# Patient Record
Sex: MALE | Race: WHITE | Employment: OTHER | URBAN - METROPOLITAN AREA
[De-identification: names, ages, dates, MRNs, and addresses within clinical notes are randomized per-mention and may not be internally consistent; named-entity substitution may affect disease eponyms.]

---

## 2017-04-25 ENCOUNTER — HOSPITAL ENCOUNTER (EMERGENCY)
Age: 62
Discharge: HOME | End: 2017-04-25
Attending: EMERGENCY MEDICINE
Payer: MEDICARE

## 2017-04-25 ENCOUNTER — APPOINTMENT (OUTPATIENT)
Dept: CT IMAGING | Age: 62
End: 2017-04-25
Attending: EMERGENCY MEDICINE
Payer: MEDICARE

## 2017-04-25 VITALS
TEMPERATURE: 98 F | HEIGHT: 73 IN | WEIGHT: 290 LBS | DIASTOLIC BLOOD PRESSURE: 78 MMHG | BODY MASS INDEX: 38.43 KG/M2 | HEART RATE: 71 BPM | SYSTOLIC BLOOD PRESSURE: 170 MMHG | OXYGEN SATURATION: 95 % | RESPIRATION RATE: 18 BRPM

## 2017-04-25 DIAGNOSIS — N20.1 URETEROLITHIASIS: Primary | ICD-10-CM

## 2017-04-25 LAB
ALBUMIN SERPL BCP-MCNC: 3.7 G/DL (ref 3.2–4.6)
ALBUMIN/GLOB SERPL: 1 {RATIO} (ref 1.2–3.5)
ALP SERPL-CCNC: 82 U/L (ref 50–136)
ALT SERPL-CCNC: 33 U/L (ref 12–65)
ANION GAP BLD CALC-SCNC: 8 MMOL/L (ref 7–16)
AST SERPL W P-5'-P-CCNC: 27 U/L (ref 15–37)
BACTERIA URNS QL MICRO: 0 /HPF
BASOPHILS # BLD AUTO: 0 K/UL (ref 0–0.2)
BASOPHILS # BLD: 0 % (ref 0–2)
BILIRUB SERPL-MCNC: 1.2 MG/DL (ref 0.2–1.1)
BUN SERPL-MCNC: 16 MG/DL (ref 8–23)
CALCIUM SERPL-MCNC: 9.2 MG/DL (ref 8.3–10.4)
CASTS URNS QL MICRO: 0 /LPF
CHLORIDE SERPL-SCNC: 105 MMOL/L (ref 98–107)
CO2 SERPL-SCNC: 27 MMOL/L (ref 21–32)
CREAT SERPL-MCNC: 1.19 MG/DL (ref 0.8–1.5)
DIFFERENTIAL METHOD BLD: ABNORMAL
EOSINOPHIL # BLD: 0.1 K/UL (ref 0–0.8)
EOSINOPHIL NFR BLD: 1 % (ref 0.5–7.8)
EPI CELLS #/AREA URNS HPF: 0 /HPF
ERYTHROCYTE [DISTWIDTH] IN BLOOD BY AUTOMATED COUNT: 12.9 % (ref 11.9–14.6)
GLOBULIN SER CALC-MCNC: 3.6 G/DL (ref 2.3–3.5)
GLUCOSE SERPL-MCNC: 92 MG/DL (ref 65–100)
HCT VFR BLD AUTO: 46.7 % (ref 41.1–50.3)
HGB BLD-MCNC: 16.4 G/DL (ref 13.6–17.2)
IMM GRANULOCYTES # BLD: 0 K/UL (ref 0–0.5)
IMM GRANULOCYTES NFR BLD AUTO: 0.1 % (ref 0–5)
LIPASE SERPL-CCNC: 97 U/L (ref 73–393)
LYMPHOCYTES # BLD AUTO: 17 % (ref 13–44)
LYMPHOCYTES # BLD: 1.4 K/UL (ref 0.5–4.6)
MCH RBC QN AUTO: 31.7 PG (ref 26.1–32.9)
MCHC RBC AUTO-ENTMCNC: 35.1 G/DL (ref 31.4–35)
MCV RBC AUTO: 90.3 FL (ref 79.6–97.8)
MONOCYTES # BLD: 1 K/UL (ref 0.1–1.3)
MONOCYTES NFR BLD AUTO: 12 % (ref 4–12)
NEUTS SEG # BLD: 5.7 K/UL (ref 1.7–8.2)
NEUTS SEG NFR BLD AUTO: 70 % (ref 43–78)
PLATELET # BLD AUTO: 164 K/UL (ref 150–450)
PMV BLD AUTO: 10.4 FL (ref 10.8–14.1)
POTASSIUM SERPL-SCNC: 4.5 MMOL/L (ref 3.5–5.1)
PROT SERPL-MCNC: 7.3 G/DL (ref 6.3–8.2)
RBC # BLD AUTO: 5.17 M/UL (ref 4.23–5.67)
RBC #/AREA URNS HPF: 0 /HPF
SODIUM SERPL-SCNC: 140 MMOL/L (ref 136–145)
WBC # BLD AUTO: 8.2 K/UL (ref 4.3–11.1)
WBC URNS QL MICRO: NORMAL /HPF

## 2017-04-25 PROCEDURE — 85025 COMPLETE CBC W/AUTO DIFF WBC: CPT | Performed by: EMERGENCY MEDICINE

## 2017-04-25 PROCEDURE — 96374 THER/PROPH/DIAG INJ IV PUSH: CPT | Performed by: EMERGENCY MEDICINE

## 2017-04-25 PROCEDURE — 83690 ASSAY OF LIPASE: CPT | Performed by: EMERGENCY MEDICINE

## 2017-04-25 PROCEDURE — 81015 MICROSCOPIC EXAM OF URINE: CPT | Performed by: EMERGENCY MEDICINE

## 2017-04-25 PROCEDURE — 99284 EMERGENCY DEPT VISIT MOD MDM: CPT | Performed by: EMERGENCY MEDICINE

## 2017-04-25 PROCEDURE — 74011250636 HC RX REV CODE- 250/636: Performed by: EMERGENCY MEDICINE

## 2017-04-25 PROCEDURE — 96375 TX/PRO/DX INJ NEW DRUG ADDON: CPT | Performed by: EMERGENCY MEDICINE

## 2017-04-25 PROCEDURE — 74176 CT ABD & PELVIS W/O CONTRAST: CPT

## 2017-04-25 PROCEDURE — 81003 URINALYSIS AUTO W/O SCOPE: CPT | Performed by: EMERGENCY MEDICINE

## 2017-04-25 PROCEDURE — 80053 COMPREHEN METABOLIC PANEL: CPT | Performed by: EMERGENCY MEDICINE

## 2017-04-25 RX ORDER — OXYCODONE AND ACETAMINOPHEN 5; 325 MG/1; MG/1
1-2 TABLET ORAL
Qty: 20 TAB | Refills: 0 | Status: SHIPPED | OUTPATIENT
Start: 2017-04-25 | End: 2017-05-02

## 2017-04-25 RX ORDER — ONDANSETRON 4 MG/1
4 TABLET, ORALLY DISINTEGRATING ORAL
Qty: 10 TAB | Refills: 1 | Status: SHIPPED | OUTPATIENT
Start: 2017-04-25 | End: 2017-07-27

## 2017-04-25 RX ORDER — TAMSULOSIN HYDROCHLORIDE 0.4 MG/1
0.4 CAPSULE ORAL DAILY
Qty: 15 CAP | Refills: 0 | Status: SHIPPED | OUTPATIENT
Start: 2017-04-25 | End: 2017-05-10

## 2017-04-25 RX ORDER — ONDANSETRON 2 MG/ML
4 INJECTION INTRAMUSCULAR; INTRAVENOUS
Status: COMPLETED | OUTPATIENT
Start: 2017-04-25 | End: 2017-04-25

## 2017-04-25 RX ORDER — HYDROMORPHONE HYDROCHLORIDE 1 MG/ML
1 INJECTION, SOLUTION INTRAMUSCULAR; INTRAVENOUS; SUBCUTANEOUS
Status: COMPLETED | OUTPATIENT
Start: 2017-04-25 | End: 2017-04-25

## 2017-04-25 RX ADMIN — HYDROMORPHONE HYDROCHLORIDE 1 MG: 1 INJECTION, SOLUTION INTRAMUSCULAR; INTRAVENOUS; SUBCUTANEOUS at 13:03

## 2017-04-25 RX ADMIN — ONDANSETRON 4 MG: 2 INJECTION INTRAMUSCULAR; INTRAVENOUS at 13:03

## 2017-04-25 NOTE — ED TRIAGE NOTES
Pt in c/o left flank pain starting yesterday. States history of stones. States nausea headache and chills. Denies dysuria. Denies vomiting.

## 2017-04-25 NOTE — DISCHARGE INSTRUCTIONS
Kidney Stone: Care Instructions  Your Care Instructions    Kidney stones are formed when salts, minerals, and other substances normally found in the urine clump together. They can be as small as grains of sand or, rarely, as large as golf balls. While the stone is traveling through the ureter, which is the tube that carries urine from the kidney to the bladder, you will probably feel pain. The pain may be mild or very severe. You may also have some blood in your urine. As soon as the stone reaches the bladder, any intense pain should go away. If a stone is too large to pass on its own, you may need a medical procedure to help you pass the stone. The doctor has checked you carefully, but problems can develop later. If you notice any problems or new symptoms, get medical treatment right away. Follow-up care is a key part of your treatment and safety. Be sure to make and go to all appointments, and call your doctor if you are having problems. It's also a good idea to know your test results and keep a list of the medicines you take. How can you care for yourself at home? · Drink plenty of fluids, enough so that your urine is light yellow or clear like water. If you have kidney, heart, or liver disease and have to limit fluids, talk with your doctor before you increase the amount of fluids you drink. · Take pain medicines exactly as directed. Call your doctor if you think you are having a problem with your medicine. ¨ If the doctor gave you a prescription medicine for pain, take it as prescribed. ¨ If you are not taking a prescription pain medicine, ask your doctor if you can take an over-the-counter medicine. Read and follow all instructions on the label. · Your doctor may ask you to strain your urine so that you can collect your kidney stone when it passes. You can use a kitchen strainer or a tea strainer to catch the stone. Store it in a plastic bag until you see your doctor again.   Preventing future kidney stones  Some changes in your diet may help prevent kidney stones. Depending on the cause of your stones, your doctor may recommend that you:  · Drink plenty of fluids, enough so that your urine is light yellow or clear like water. If you have kidney, heart, or liver disease and have to limit fluids, talk with your doctor before you increase the amount of fluids you drink. · Limit coffee, tea, and alcohol. Also avoid grapefruit juice. · Do not take more than the recommended daily dose of vitamins C and D.  · Avoid antacids such as Gaviscon, Maalox, Mylanta, or Tums. · Limit the amount of salt (sodium) in your diet. · Eat a balanced diet that is not too high in protein. · Limit foods that are high in a substance called oxalate, which can cause kidney stones. These foods include dark green vegetables, rhubarb, chocolate, wheat bran, nuts, cranberries, and beans. When should you call for help? Call your doctor now or seek immediate medical care if:  · You cannot keep down fluids. · Your pain gets worse. · You have a fever or chills. · You have new or worse pain in your back just below your rib cage (the flank area). · You have new or more blood in your urine. Watch closely for changes in your health, and be sure to contact your doctor if:  · You do not get better as expected. Where can you learn more? Go to http://pepe-ethan.info/. Enter O825 in the search box to learn more about \"Kidney Stone: Care Instructions. \"  Current as of: November 20, 2015  Content Version: 11.2  © 2991-5143 [employer]. Care instructions adapted under license by [employer] (which disclaims liability or warranty for this information). If you have questions about a medical condition or this instruction, always ask your healthcare professional. [address] any warranty or liability for your use of this information.

## 2017-04-25 NOTE — ED PROVIDER NOTES
Patient is a 59 y.o. male presenting with flank pain. The history is provided by the patient and the spouse. Flank Pain    This is a new problem. The current episode started yesterday. The problem has been gradually worsening. The problem occurs constantly. Patient reports not work related injury. The pain is associated with no known injury. The pain is present in the lower back and left side. The quality of the pain is described as aching and sharp. Radiates to: Left mid abdomen. The pain is at a severity of 7/10. The symptoms are aggravated by bending, twisting and certain positions. The pain is the same all the time. Stiffness is present all day. Associated symptoms include abdominal pain. Pertinent negatives include no chest pain, no fever, no numbness, no weight loss, no headaches, no abdominal swelling, no bowel incontinence, no perianal numbness, no bladder incontinence, no dysuria, no pelvic pain, no leg pain, no paresthesias, no paresis, no tingling and no weakness. He has tried bed rest for the symptoms. The treatment provided no relief. Risk factors include obesity and history of kidney stones.  The patient's surgical history non-contributory        Past Medical History:   Diagnosis Date    Arrhythmia     per pt-- only right after cabg---1999-- resolved after cardioversion 1999    Arthritis     osteo    CAD (coronary artery disease)     CABG x5 in 1999---Denies stents---found with stress test--no problems since per pt--- followed by dr Dolores Evans Chronic pain x 7 yrs    back    Constipation     with pain meds- takes MOM as needed    Degenerative disc disease     Hypercholesteremia     med    Hypertension     controlled with med    Kidney stone     Sleep apnea     per pt--- was told by nurse \"probably\" had it 2005 ago-- but was never tested    Spinal stenosis        Past Surgical History:   Procedure Laterality Date   [place]    CABG x 5    HX COLONOSCOPY      HX LUMBAR LAMINECTOMY  12/2015    HX ORTHOPAEDIC      trigger finger release    HX ORTHOPAEDIC Bilateral 04/2015    ctr    HX OTHER SURGICAL  3/2016, 4/2016    perianal abscess x 2     TOTAL HIP ARTHROPLASTY Right 2005    TOTAL KNEE ARTHROPLASTY [facility] 9/2015 [facility] [place]         Family History:   Problem Relation Age of Onset    Hypertension Mother     Hypertension Father     Heart Disease Father     Malignant Hyperthermia Neg Hx     Pseudocholinesterase Deficiency Neg Hx     Delayed Awakening Neg Hx     Post-op Nausea/Vomiting Neg Hx     Emergence Delirium Neg Hx     Post-op Cognitive Dysfunction Neg Hx     Other Neg Hx        Social History     Social History    Marital status:      Spouse name: N/A    Number of children: N/A    Years of education: N/A     Occupational History    Not on file. Social History Main Topics    Smoking status: Former Smoker     Packs/day: 0.75     Years: 30.00     Quit date: 9/26/2011    Smokeless tobacco: Never Used    Alcohol use 7.8 - 9.0 oz/week     3 - 5 Shots of liquor, 10 Standard drinks or equivalent per week    Drug use: No    Sexual activity: Not on file     Other Topics Concern    Not on file     Social History Narrative         ALLERGIES: Codeine    Review of Systems   Constitutional: Negative for chills, fever and weight loss. Cardiovascular: Negative for chest pain. Gastrointestinal: Positive for abdominal pain. Negative for bowel incontinence. Genitourinary: Positive for flank pain. Negative for bladder incontinence, dysuria and pelvic pain. Neurological: Negative for tingling, weakness, numbness, headaches and paresthesias. All other systems reviewed and are negative.       Vitals:    04/25/17 1142 04/25/17 1200 04/25/17 1230 04/25/17 1253   BP:  166/75 158/72    Pulse: 69 63 65 64   Resp:       Temp:       SpO2: 96% 96% 96% 98%   Weight:       Height:                Physical Exam   Constitutional: He is oriented to person, place, and time. He appears well-developed and well-nourished. He appears distressed. HENT:   Head: Normocephalic and atraumatic. Right Ear: Tympanic membrane and external ear normal.   Left Ear: Tympanic membrane and external ear normal.   Mouth/Throat: Oropharynx is clear and moist.   Eyes: Conjunctivae and EOM are normal. Pupils are equal, round, and reactive to light. Neck: Normal range of motion. Neck supple. No tracheal deviation present. Cardiovascular: Normal rate, regular rhythm, normal heart sounds and intact distal pulses. Exam reveals no gallop and no friction rub. No murmur heard. Pulmonary/Chest: Effort normal and breath sounds normal. No respiratory distress. He has no wheezes. Abdominal: Soft. Bowel sounds are normal. He exhibits no shifting dullness, no distension, no pulsatile liver, no fluid wave, no abdominal bruit, no pulsatile midline mass and no mass. There is no hepatosplenomegaly. There is tenderness in the left lower quadrant. There is CVA tenderness (left). There is no rigidity, no rebound, no guarding, no tenderness at McBurney's point and negative Saravia's sign. No hernia. Musculoskeletal: Normal range of motion. He exhibits no edema. Lymphadenopathy:     He has no cervical adenopathy. Neurological: He is alert and oriented to person, place, and time. He displays normal reflexes. No cranial nerve deficit. Skin: Skin is warm and dry. No rash noted. He is not diaphoretic. No erythema. Psychiatric: He has a normal mood and affect. Nursing note and vitals reviewed. MDM  ED Course       Procedures      The patient was observed in the ED.     Results Reviewed:      Recent Results (from the past 24 hour(s))   URINE MICROSCOPIC    Collection Time: 04/25/17 11:45 AM   Result Value Ref Range    WBC 3-5 0 /hpf    RBC 0 0 /hpf    Epithelial cells 0 0 /hpf    Bacteria 0 0 /hpf    Casts 0 0 /lpf   CBC WITH AUTOMATED DIFF    Collection Time: 04/25/17 11:50 AM   Result Value Ref Range    WBC 8.2 4.3 - 11.1 K/uL    RBC 5.17 4.23 - 5.67 M/uL    HGB 16.4 13.6 - 17.2 g/dL    HCT 46.7 41.1 - 50.3 %    MCV 90.3 79.6 - 97.8 FL    MCH 31.7 26.1 - 32.9 PG    MCHC 35.1 (H) 31.4 - 35.0 g/dL    RDW 12.9 11.9 - 14.6 %    PLATELET 334 562 - 292 K/uL    MPV 10.4 (L) 10.8 - 14.1 FL    DF AUTOMATED      NEUTROPHILS 70 43 - 78 %    LYMPHOCYTES 17 13 - 44 %    MONOCYTES 12 4.0 - 12.0 %    EOSINOPHILS 1 0.5 - 7.8 %    BASOPHILS 0 0.0 - 2.0 %    IMMATURE GRANULOCYTES 0.1 0.0 - 5.0 %    ABS. NEUTROPHILS 5.7 1.7 - 8.2 K/UL    ABS. LYMPHOCYTES 1.4 0.5 - 4.6 K/UL    ABS. MONOCYTES 1.0 0.1 - 1.3 K/UL    ABS. EOSINOPHILS 0.1 0.0 - 0.8 K/UL    ABS. BASOPHILS 0.0 0.0 - 0.2 K/UL    ABS. IMM. GRANS. 0.0 0.0 - 0.5 K/UL   METABOLIC PANEL, COMPREHENSIVE    Collection Time: 04/25/17 11:50 AM   Result Value Ref Range    Sodium 140 136 - 145 mmol/L    Potassium 4.5 3.5 - 5.1 mmol/L    Chloride 105 98 - 107 mmol/L    CO2 27 21 - 32 mmol/L    Anion gap 8 7 - 16 mmol/L    Glucose 92 65 - 100 mg/dL    BUN 16 8 - 23 MG/DL    Creatinine 1.19 0.8 - 1.5 MG/DL    GFR est AA >60 >60 ml/min/1.73m2    GFR est non-AA >60 >60 ml/min/1.73m2    Calcium 9.2 8.3 - 10.4 MG/DL    Bilirubin, total 1.2 (H) 0.2 - 1.1 MG/DL    ALT (SGPT) 33 12 - 65 U/L    AST (SGOT) 27 15 - 37 U/L    Alk. phosphatase 82 50 - 136 U/L    Protein, total 7.3 6.3 - 8.2 g/dL    Albumin 3.7 3.2 - 4.6 g/dL    Globulin 3.6 (H) 2.3 - 3.5 g/dL    A-G Ratio 1.0 (L) 1.2 - 3.5     LIPASE    Collection Time: 04/25/17 11:50 AM   Result Value Ref Range    Lipase 97 73 - 393 U/L     CT UROGRAM WO CONT   Final Result   IMPRESSION: 5 mm proximal left ureteral calculus with mild left   hydroureteronephrosis. I discussed the results of all labs, procedures, radiographs, and treatments with the patient and available family. Treatment plan is agreed upon and the patient is ready for discharge.   All voiced understanding of the discharge plan and medication instructions or changes as appropriate. Questions about treatment in the ED were answered. All were encouraged to return should symptoms worsen or new problems develop.

## 2020-05-07 ENCOUNTER — HOSPITAL ENCOUNTER (EMERGENCY)
Age: 65
Discharge: HOME | End: 2020-05-07
Attending: EMERGENCY MEDICINE
Payer: MEDICARE

## 2020-05-07 VITALS
RESPIRATION RATE: 16 BRPM | OXYGEN SATURATION: 98 % | WEIGHT: 285 LBS | HEIGHT: 73 IN | HEART RATE: 53 BPM | DIASTOLIC BLOOD PRESSURE: 99 MMHG | TEMPERATURE: 98 F | BODY MASS INDEX: 37.77 KG/M2 | SYSTOLIC BLOOD PRESSURE: 160 MMHG

## 2020-05-07 DIAGNOSIS — S61.412A LACERATION OF LEFT HAND, FOREIGN BODY PRESENCE UNSPECIFIED, INITIAL ENCOUNTER: Primary | ICD-10-CM

## 2020-05-07 PROCEDURE — 90715 TDAP VACCINE 7 YRS/> IM: CPT | Performed by: EMERGENCY MEDICINE

## 2020-05-07 PROCEDURE — 74011250636 HC RX REV CODE- 250/636: Performed by: EMERGENCY MEDICINE

## 2020-05-07 PROCEDURE — 99282 EMERGENCY DEPT VISIT SF MDM: CPT

## 2020-05-07 PROCEDURE — 75810000293 HC SIMP/SUPERF WND  RPR

## 2020-05-07 PROCEDURE — 90471 IMMUNIZATION ADMIN: CPT

## 2020-05-07 RX ORDER — CELECOXIB 200 MG/1
200 CAPSULE ORAL 2 TIMES DAILY
COMMUNITY
Start: 2020-01-13 | End: 2021-01-12

## 2020-05-07 RX ORDER — CETIRIZINE HCL 10 MG
10 TABLET ORAL DAILY PRN
COMMUNITY

## 2020-05-07 RX ADMIN — TETANUS TOXOID, REDUCED DIPHTHERIA TOXOID AND ACELLULAR PERTUSSIS VACCINE, ADSORBED 0.5 ML: 5; 2.5; 8; 8; 2.5 SUSPENSION INTRAMUSCULAR at 14:04

## 2020-05-07 NOTE — DISCHARGE INSTRUCTIONS
Antibiotic ointment of choice for three days  Keep stitches in for 7 days  May wash gently, but do NOT soak or submerge. Keep clean and dry with bandage  Return if worse in any way  Follow up with awilda ayala Dr      Patient Education        Cuts on the Hand Closed With Stitches: Care Instructions  Your Care Instructions    A cut on your hand can be on your fingers, your thumb, or the front or back of your hand. Sometimes a cut can injure the tendons, blood vessels, or nerves of your hand. The doctor used stitches to close the cut. Using stitches also helps the cut heal and reduces scarring. The doctor may have given you a splint to help prevent you from moving your hand, fingers, or thumb. If the cut went deep and through the skin, the doctor put in two layers of stitches. The deeper layer brings the deep part of the cut together. These stitches will dissolve and don't need to be removed. The stitches in the upper layer are the ones you see on the cut. You will probably have a bandage. You will need to have the stitches removed, usually in 7 to 14 days. The doctor may suggest that you see a hand specialist if the cut is very deep or if you have trouble moving your fingers or have less feeling in your hand. The doctor has checked you carefully, but problems can develop later. If you notice any problems or new symptoms, get medical treatment right away. Follow-up care is a key part of your treatment and safety. Be sure to make and go to all appointments, and call your doctor if you are having problems. It's also a good idea to know your test results and keep a list of the medicines you take. How can you care for yourself at home? · Keep the cut dry for the first 24 to 48 hours. After this, you can shower if your doctor okays it. Pat the cut dry. · Don't soak the cut, such as in a bathtub. Your doctor will tell you when it's safe to get the cut wet.   · If your doctor told you how to care for your cut, follow your doctor's instructions. If you did not get instructions, follow this general advice:  ? After the first 24 to 48 hours, wash around the cut with clean water 2 times a day. Don't use hydrogen peroxide or alcohol, which can slow healing. ? You may cover the cut with a thin layer of petroleum jelly, such as Vaseline, and a nonstick bandage. ? Apply more petroleum jelly and replace the bandage as needed. · Prop up the sore hand on a pillow anytime you sit or lie down during the next 3 days. Try to keep it above the level of your heart. This will help reduce swelling. · Avoid any activity that could cause your cut to reopen. · Do not remove the stitches on your own. Your doctor will tell you when to come back to have the stitches removed. · Be safe with medicines. Take pain medicines exactly as directed. ? If the doctor gave you a prescription medicine for pain, take it as prescribed. ? If you are not taking a prescription pain medicine, ask your doctor if you can take an over-the-counter medicine. When should you call for help? Call your doctor now or seek immediate medical care if:    · You have new pain, or your pain gets worse.     · The skin near the cut is cold or pale or changes color.     · You have tingling, weakness, or numbness near the cut.     · The cut starts to bleed, and blood soaks through the bandage. Oozing small amounts of blood is normal.     · You have trouble moving the area of the hand near the cut.     · You have symptoms of infection, such as:  ? Increased pain, swelling, warmth, or redness around the cut.  ? Red streaks leading from the cut.  ? Pus draining from the cut.  ? A fever.    Watch closely for changes in your health, and be sure to contact your doctor if:    · You do not get better as expected. Where can you learn more?   Go to http://pepe-ethan.info/  Enter T250 in the search box to learn more about \"Cuts on the Hand Closed With Stitches: Care Instructions. \"  Current as of: June 26, 2019Content Version: 12.4  © 2421-6361 [employer]. Care instructions adapted under license by [email or web address] (which disclaims liability or warranty for this information). If you have questions about a medical condition or this instruction, always ask your healthcare professional. [place] [address] any warranty or liability for your use of this information.

## 2020-05-14 NOTE — ED PROVIDER NOTES
78-year-old gentleman presents with an accidental laceration to his left hand he was using a knife to open her prior something when it slipped, stabbing him to the left dorsal first metacarpal area  He reports no numbness or tingling           Past Medical History:   Diagnosis Date    Arrhythmia     per pt-- only right after cabg---1999-- resolved after cardioversion 1999    Arthritis     osteo    CAD (coronary artery disease)     CABG x5 in 1999---Denies stents---found with stress test--no problems since per pt--- followed by dr Hugo Ren Chronic pain x 7 yrs    back    Constipation     with pain meds- takes MOM as needed    Degenerative disc disease     Hypercholesteremia     med    Hypertension     controlled with med    Kidney stone     Sleep apnea     per pt--- was told by nurse \"probably\" had it 2005 ago-- but was never tested    Spinal stenosis        Past Surgical History:   Procedure Laterality Date    CARDIAC SURG PROCEDURE UNLIST  1999    CABG x 5    HX COLONOSCOPY      HX LUMBAR LAMINECTOMY  12/2015    HX ORTHOPAEDIC      trigger finger release    HX ORTHOPAEDIC Bilateral 04/2015    ctr    HX OTHER SURGICAL  3/2016, 4/2016    perianal abscess x 2     TOTAL HIP ARTHROPLASTY Right 2005    TOTAL KNEE ARTHROPLASTY Left 9/2015 at [facility] [place]         Family History:   Problem Relation Age of Onset    Hypertension Mother     Hypertension Father     Heart Disease Father     Malignant Hyperthermia Neg Hx     Pseudocholinesterase Deficiency Neg Hx     Delayed Awakening Neg Hx     Post-op Nausea/Vomiting Neg Hx     Emergence Delirium Neg Hx     Post-op Cognitive Dysfunction Neg Hx     Other Neg Hx        Social History     Socioeconomic History    Marital status:      Spouse name: Not on file    Number of children: Not on file    Years of education: Not on file    Highest education level: Not on file   Occupational History    Not on file   Social Needs    Financial resource strain: Not on file    Food insecurity     Worry: Not on file     Inability: Not on file    Transportation needs     Medical: Not on file     Non-medical: Not on file   Tobacco Use    Smoking status: Former Smoker     Packs/day: 0.75     Years: 30.00     Pack years: 22.50     Last attempt to quit: 2011     Years since quittin.6    Smokeless tobacco: Never Used   Substance and Sexual Activity    Alcohol use: Yes     Alcohol/week: 13.0 - 15.0 standard drinks     Types: 3 - 5 Shots of liquor, 10 Standard drinks or equivalent per week    Drug use: No    Sexual activity: Not on file   Lifestyle    Physical activity     Days per week: Not on file     Minutes per session: Not on file    Stress: Not on file   Relationships    Social connections     Talks on phone: Not on file     Gets together: Not on file     Attends Orthodox service: Not on file     Active member of club or organization: Not on file     Attends meetings of clubs or organizations: Not on file     Relationship status: Not on file    Intimate partner violence     Fear of current or ex partner: Not on file     Emotionally abused: Not on file     Physically abused: Not on file     Forced sexual activity: Not on file   Other Topics Concern    Not on file   Social History Narrative    Not on file         ALLERGIES: Codeine    Review of Systems   Musculoskeletal: Negative for arthralgias and joint swelling. Skin: Positive for wound. Negative for color change. Neurological: Negative for weakness and numbness. Vitals:    20 1256   BP: (!) 160/99   Pulse: (!) 53   Resp: 16   Temp: 98 °F (36.7 °C)   SpO2: 98%   Weight: 129.3 kg (285 lb)   Height: 6' 1\" (1.854 m)            Physical Exam  Vitals signs and nursing note reviewed. Constitutional:       General: He is not in acute distress. Appearance: Normal appearance. He is well-developed. He is not ill-appearing, toxic-appearing or diaphoretic.    HENT:      Head: Normocephalic and atraumatic. Eyes:      General:         Right eye: No discharge. Left eye: No discharge. Conjunctiva/sclera: Conjunctivae normal.   Neck:      Musculoskeletal: Normal range of motion and neck supple. Pulmonary:      Effort: Pulmonary effort is normal. No respiratory distress. Musculoskeletal: Normal range of motion. General: Tenderness present. Left hand: He exhibits laceration. He exhibits normal range of motion. Hands:    Skin:     General: Skin is warm and dry. Findings: No rash. Neurological:      General: No focal deficit present. Mental Status: He is alert and oriented to person, place, and time. Mental status is at baseline. Motor: No abnormal muscle tone. Comments: cni 2-12 grossly  Nl gait,  Nl speech     Psychiatric:         Mood and Affect: Mood normal.         Behavior: Behavior normal.          MDM  Number of Diagnoses or Management Options  Laceration of left hand, foreign body presence unspecified, initial encounter:   Diagnosis management comments: Medical decision making note:  Left hand laceration, now status post 3 sutures  This concludes the \"medical decision making note\" part of this emergency department visit note. Wound Repair  Performed by: attendingPreparation: skin prepped with Betadine  Location details: right hand  Wound length:2.5 cm or less  Anesthesia: local infiltration    Anesthesia:  Local Anesthetic: lidocaine 1% without epinephrine  Anesthetic total: 3 mL  Irrigation solution: saline  Irrigation method: syringe  Debridement: none  Skin closure: 4-0 nylon  Number of sutures: 3  Technique: simple  Approximation: close  Dressing: antibiotic ointment  Patient tolerance: Patient tolerated the procedure well with no immediate complications  My total time at bedside, performing this procedure was 1-15 minutes.

## 2024-04-25 ENCOUNTER — OFFICE VISIT (OUTPATIENT)
Dept: ORTHOPEDIC SURGERY | Age: 69
End: 2024-04-25
Payer: MEDICARE

## 2024-04-25 DIAGNOSIS — M54.50 LOW BACK PAIN, UNSPECIFIED BACK PAIN LATERALITY, UNSPECIFIED CHRONICITY, UNSPECIFIED WHETHER SCIATICA PRESENT: Primary | ICD-10-CM

## 2024-04-25 DIAGNOSIS — Z96.641 S/P TOTAL RIGHT HIP ARTHROPLASTY: ICD-10-CM

## 2024-04-25 DIAGNOSIS — M47.816 FACET ARTHROPATHY, LUMBAR: ICD-10-CM

## 2024-04-25 DIAGNOSIS — M51.36 DDD (DEGENERATIVE DISC DISEASE), LUMBAR: ICD-10-CM

## 2024-04-25 PROCEDURE — 1123F ACP DISCUSS/DSCN MKR DOCD: CPT | Performed by: PHYSICIAN ASSISTANT

## 2024-04-25 PROCEDURE — 99203 OFFICE O/P NEW LOW 30 MIN: CPT | Performed by: PHYSICIAN ASSISTANT

## 2024-04-25 RX ORDER — SILDENAFIL 100 MG/1
100 TABLET, FILM COATED ORAL PRN
COMMUNITY
Start: 2023-05-16

## 2024-04-25 RX ORDER — AZITHROMYCIN 250 MG/1
TABLET, FILM COATED ORAL
COMMUNITY
Start: 2023-08-24

## 2024-04-25 RX ORDER — ALBUTEROL SULFATE 90 UG/1
2 AEROSOL, METERED RESPIRATORY (INHALATION) EVERY 6 HOURS PRN
COMMUNITY
Start: 2024-02-09

## 2024-04-25 RX ORDER — TADALAFIL 5 MG/1
TABLET ORAL
COMMUNITY

## 2024-04-25 RX ORDER — FINASTERIDE 5 MG/1
5 TABLET, FILM COATED ORAL DAILY
COMMUNITY
Start: 2023-08-18 | End: 2024-08-12

## 2024-04-25 RX ORDER — AMOXICILLIN 500 MG/1
TABLET, FILM COATED ORAL
COMMUNITY

## 2024-04-25 RX ORDER — TAMSULOSIN HYDROCHLORIDE 0.4 MG/1
0.4 CAPSULE ORAL DAILY
COMMUNITY
Start: 2023-08-02

## 2024-04-25 RX ORDER — ROSUVASTATIN CALCIUM 40 MG/1
40 TABLET, COATED ORAL
COMMUNITY
Start: 2024-04-12

## 2024-04-25 NOTE — PROGRESS NOTES
An order for PT on the Lumbar Spine has been entered.  A referral to [facility] has been placed.   
possibility of an extraspinal source for the patients pain.      4 This is a chronic illness/condition with exacerbation and progression    No orders of the defined types were placed in this encounter.       Orders Placed This Encounter   Procedures    XR LUMBAR SPINE (2-3 VIEWS)              Return in about 6 weeks (around 6/6/2024) for after PT with JERAMIE.     Kyra Dumas PA-C  04/25/24      Elements of this note were created using speech recognition software.  As such, errors of speech recognition may be present.

## 2024-05-22 ENCOUNTER — TELEPHONE (OUTPATIENT)
Dept: ORTHOPEDIC SURGERY | Age: 69
End: 2024-05-22

## 2024-05-23 ENCOUNTER — EVALUATION (OUTPATIENT)
Age: 69
End: 2024-05-23
Payer: MEDICARE

## 2024-05-23 DIAGNOSIS — G89.29 CHRONIC RIGHT HIP PAIN: ICD-10-CM

## 2024-05-23 DIAGNOSIS — M25.551 CHRONIC RIGHT HIP PAIN: ICD-10-CM

## 2024-05-23 DIAGNOSIS — M54.50 CHRONIC LOW BACK PAIN WITHOUT SCIATICA, UNSPECIFIED BACK PAIN LATERALITY: ICD-10-CM

## 2024-05-23 DIAGNOSIS — R53.1 WEAKNESS GENERALIZED: ICD-10-CM

## 2024-05-23 DIAGNOSIS — G89.29 CHRONIC LOW BACK PAIN WITHOUT SCIATICA, UNSPECIFIED BACK PAIN LATERALITY: ICD-10-CM

## 2024-05-23 DIAGNOSIS — R29.3 POSTURE ABNORMALITY: ICD-10-CM

## 2024-05-23 DIAGNOSIS — M25.69 BACK STIFFNESS: Primary | ICD-10-CM

## 2024-05-23 PROCEDURE — 97110 THERAPEUTIC EXERCISES: CPT | Performed by: PHYSICAL THERAPIST

## 2024-05-23 PROCEDURE — 97162 PT EVAL MOD COMPLEX 30 MIN: CPT | Performed by: PHYSICAL THERAPIST

## 2024-05-23 NOTE — PROGRESS NOTES
movement, sitting       Neuro screen: denies numbness, tingling, and radiating pain    Social/Functional Hx:  How would you rate your overall health? good  Pt lives with independent spouse in a(n) 1 story house with 3-4 entry steps.   Current DME: none  Work Status: Retired   Sleep: normal  PLOF & Social Hx/Interests: Independent and active without physical limitations, Independent community ambulator, Independent household ambulator, Independent with all ADLs, drove independently, and participated in gym workouts however stopped d/t COVID shutdown.  Current level of function: modified independent with general mobility d/t concomitant pain and stiffness.    Patient Stated Goals:   Improve morning mobility  Alleviate lumbar tension that occurs with prolonged activity or sitting          OBJECTIVE EXAMINATION     Functional Outcome Questionnaire: Oswestry Low Back Pain Disability Questionnaire: 3/50 = 6% Functional Deficit   Observation:   Posture: Low right IC  Scar binding     ROM:     FLEX: 50%   EXT: 25%    Right Left   Sidebending 25%  50%    Rotation 25% 50%          MMT: RIGHT LEFT   HIP FLEX: 3+/5 4-/5   HIP ABD: 3+/5 4/5   HIP EXT: 3+/5 4/5   KNEE EXT: 5/5 5/5   KNEE FLEX: 5/5 5/5   DF: 5/5 5/5   PF: 5/5 5/5      Innominate Assessment RIGHT   Posterior Spring: Restricted   ASIS: High      Skin Integrity: normal     Special Tests/Function:     Gait:   Assistive Device None   Initial Contact Decreased Heel Strike   Mid-stance Achieves flat foot   Terminal Stance Normal   Swing Phase Passes stance leg   Gait Speed WFL   Quality Non-antalgic          Treatment provided today consisted of initial evaluation followed by:  Therapeutic exercise (21834) x 15 min to address ROM/strength deficits and to develop an initial HEP as noted below.    Access Code: [email or web address]  URL: [email or web address]  Date: 05/27/2024  Prepared by: Octavia Landeros DPT    Exercises  - Heat  - 2 x daily - 7 x weekly - 10 minutes

## 2024-05-28 ENCOUNTER — TREATMENT (OUTPATIENT)
Age: 69
End: 2024-05-28
Payer: MEDICARE

## 2024-05-28 DIAGNOSIS — M25.69 BACK STIFFNESS: Primary | ICD-10-CM

## 2024-05-28 DIAGNOSIS — R29.3 POSTURE ABNORMALITY: ICD-10-CM

## 2024-05-28 DIAGNOSIS — R53.1 WEAKNESS GENERALIZED: ICD-10-CM

## 2024-05-28 DIAGNOSIS — G89.29 CHRONIC LOW BACK PAIN WITHOUT SCIATICA, UNSPECIFIED BACK PAIN LATERALITY: ICD-10-CM

## 2024-05-28 DIAGNOSIS — G89.29 CHRONIC RIGHT HIP PAIN: ICD-10-CM

## 2024-05-28 DIAGNOSIS — M54.50 CHRONIC LOW BACK PAIN WITHOUT SCIATICA, UNSPECIFIED BACK PAIN LATERALITY: ICD-10-CM

## 2024-05-28 DIAGNOSIS — M25.551 CHRONIC RIGHT HIP PAIN: ICD-10-CM

## 2024-05-28 PROCEDURE — 97110 THERAPEUTIC EXERCISES: CPT | Performed by: PHYSICAL THERAPY ASSISTANT

## 2024-05-28 PROCEDURE — 97530 THERAPEUTIC ACTIVITIES: CPT | Performed by: PHYSICAL THERAPY ASSISTANT

## 2024-05-28 NOTE — PROGRESS NOTES
and technique.  Patient will increase gross lumbar AROM by 25% to improve ability to perform bed mobility and transfers.  Patient to subjectively report a decrease in pain to 6/10 at worst to allow patient to increase function with ADLs.   Patient will achieve an average score on the Patient-Specific Functional Scale ? 5/10 indicating improving functional mobility.    Long term goals to be met by 7/22/2024  (8 weeks)  Patient will achieve functional lumbar AROM to perform bed mobility, transfers, and MRADLS.  Patient will be able to return to his pre-Covid workout routine.  Patient to subjectively report a decrease in pain to 3/10 at worst to allow patient to increase function with ADLs.   Patient will achieve an average score on the Patient-Specific Functional Scale ? 7/10 indicating improving functional mobility.  Discharged from [facility]

## 2024-05-30 ENCOUNTER — TREATMENT (OUTPATIENT)
Age: 69
End: 2024-05-30
Payer: MEDICARE

## 2024-05-30 DIAGNOSIS — M25.69 BACK STIFFNESS: Primary | ICD-10-CM

## 2024-05-30 DIAGNOSIS — G89.29 CHRONIC LOW BACK PAIN WITHOUT SCIATICA, UNSPECIFIED BACK PAIN LATERALITY: ICD-10-CM

## 2024-05-30 DIAGNOSIS — R53.1 WEAKNESS GENERALIZED: ICD-10-CM

## 2024-05-30 DIAGNOSIS — M25.551 CHRONIC RIGHT HIP PAIN: ICD-10-CM

## 2024-05-30 DIAGNOSIS — G89.29 CHRONIC RIGHT HIP PAIN: ICD-10-CM

## 2024-05-30 DIAGNOSIS — M54.50 CHRONIC LOW BACK PAIN WITHOUT SCIATICA, UNSPECIFIED BACK PAIN LATERALITY: ICD-10-CM

## 2024-05-30 DIAGNOSIS — R29.3 POSTURE ABNORMALITY: ICD-10-CM

## 2024-05-30 PROCEDURE — 97110 THERAPEUTIC EXERCISES: CPT | Performed by: PHYSICAL THERAPIST

## 2024-05-30 PROCEDURE — 97140 MANUAL THERAPY 1/> REGIONS: CPT | Performed by: PHYSICAL THERAPIST

## 2024-06-06 ENCOUNTER — TREATMENT (OUTPATIENT)
Age: 69
End: 2024-06-06
Payer: MEDICARE

## 2024-06-06 DIAGNOSIS — R29.3 POSTURE ABNORMALITY: ICD-10-CM

## 2024-06-06 DIAGNOSIS — G89.29 CHRONIC LOW BACK PAIN WITHOUT SCIATICA, UNSPECIFIED BACK PAIN LATERALITY: ICD-10-CM

## 2024-06-06 DIAGNOSIS — M25.69 BACK STIFFNESS: Primary | ICD-10-CM

## 2024-06-06 DIAGNOSIS — M25.551 CHRONIC RIGHT HIP PAIN: ICD-10-CM

## 2024-06-06 DIAGNOSIS — G89.29 CHRONIC RIGHT HIP PAIN: ICD-10-CM

## 2024-06-06 DIAGNOSIS — R53.1 WEAKNESS GENERALIZED: ICD-10-CM

## 2024-06-06 DIAGNOSIS — M54.50 CHRONIC LOW BACK PAIN WITHOUT SCIATICA, UNSPECIFIED BACK PAIN LATERALITY: ICD-10-CM

## 2024-06-06 PROCEDURE — 97140 MANUAL THERAPY 1/> REGIONS: CPT | Performed by: PHYSICAL THERAPIST

## 2024-06-06 PROCEDURE — 97110 THERAPEUTIC EXERCISES: CPT | Performed by: PHYSICAL THERAPIST

## 2024-06-06 NOTE — PROGRESS NOTES
[facility]  [address] [place] [id number]  Dept: [phone]      Physical Therapy Daily Note     Referring MD: Kyra Dumas, PAPorfirioC Total Timed Procedure Codes: 40 min, Total Time: 40 min   Diagnosis:     ICD-10-CM    1. Back stiffness  M25.69       2. Chronic right hip pain  M25.551     G89.29       3. Chronic low back pain without sciatica, unspecified back pain laterality  M54.50     G89.29       4. Weakness generalized  R53.1       5. Posture abnormality  R29.3             Time In: 09:20 AM  Time Out: 10:00 AM   Therapy precautions:None Visit: 4    Co-morbidities affecting plan of care: Right CARISSA with Dr. Escobar in 2005, Lumbar laminectomy 12/2015, Left TKA 9/2015 Modifier needed: No     Payor: Payor: [employer] /  /  /  Billing pattern: Government- total time      PERTINENT MEDICAL HISTORY     Past medical and surgical history:   Past Medical History:   Diagnosis Date    Arrhythmia     per pt-- only right after cabg---1999-- resolved after cardioversion 1999    Arthritis     osteo    CAD (coronary artery disease)     CABG x5 in 1999---Denies stents---found with stress test--no problems since per pt--- followed by dr knapp    Chronic pain x 7 yrs    back    Constipation     with pain meds- takes MOM as needed    Degenerative disc disease     Hypercholesteremia     med    Hypertension     controlled with med    Kidney stone     Sleep apnea     per pt--- was told by nurse \"probably\" had it 2005 ago-- but was never tested    Spinal stenosis       Past Surgical History:   Procedure Laterality Date    COLONOSCOPY      LUMBAR LAMINECTOMY  12/2015    ORTHOPEDIC SURGERY Bilateral 04/2015    ctr    ORTHOPEDIC SURGERY      trigger finger release    OTHER SURGICAL HISTORY  3/2016, 4/2016    perianal abscess x 2     [place] CARDIAC SURG PROCEDURE UNLIST  1999    CABG x 5    TOTAL HIP ARTHROPLASTY Right 2005    TOTAL KNEE ARTHROPLASTY Left 9/2015 at [facility] [place]     Medications: reviewed in chart

## 2024-06-11 ENCOUNTER — TREATMENT (OUTPATIENT)
Age: 69
End: 2024-06-11
Payer: MEDICARE

## 2024-06-11 DIAGNOSIS — M54.50 CHRONIC LOW BACK PAIN WITHOUT SCIATICA, UNSPECIFIED BACK PAIN LATERALITY: ICD-10-CM

## 2024-06-11 DIAGNOSIS — M25.551 CHRONIC RIGHT HIP PAIN: ICD-10-CM

## 2024-06-11 DIAGNOSIS — R29.3 POSTURE ABNORMALITY: ICD-10-CM

## 2024-06-11 DIAGNOSIS — M25.69 BACK STIFFNESS: Primary | ICD-10-CM

## 2024-06-11 DIAGNOSIS — G89.29 CHRONIC RIGHT HIP PAIN: ICD-10-CM

## 2024-06-11 DIAGNOSIS — G89.29 CHRONIC LOW BACK PAIN WITHOUT SCIATICA, UNSPECIFIED BACK PAIN LATERALITY: ICD-10-CM

## 2024-06-11 DIAGNOSIS — R53.1 WEAKNESS GENERALIZED: ICD-10-CM

## 2024-06-11 PROCEDURE — 97140 MANUAL THERAPY 1/> REGIONS: CPT | Performed by: PHYSICAL THERAPIST

## 2024-06-11 NOTE — PROGRESS NOTES
[facility]  [address] [place] [id number]  Dept: [phone]      Physical Therapy Daily Note     Referring MD: Kyra Dumas, PAPorfirioC Total Timed Procedure Codes: 37 min, Total Time: 37 min   Diagnosis:     ICD-10-CM    1. Back stiffness  M25.69       2. Chronic right hip pain  M25.551     G89.29       3. Chronic low back pain without sciatica, unspecified back pain laterality  M54.50     G89.29       4. Weakness generalized  R53.1       5. Posture abnormality  R29.3             Time In: 10:43 AM  Time Out: 11:20 AM   Therapy precautions:None Visit: 5    Co-morbidities affecting plan of care: Right CARISSA with Dr. Escobar in 2005, Lumbar laminectomy 12/2015, Left TKA 9/2015 Modifier needed: No     Payor: Payor: [employer] /  /  /  Billing pattern: Government- total time      PERTINENT MEDICAL HISTORY     Past medical and surgical history:   Past Medical History:   Diagnosis Date    Arrhythmia     per pt-- only right after cabg---1999-- resolved after cardioversion 1999    Arthritis     osteo    CAD (coronary artery disease)     CABG x5 in 1999---Denies stents---found with stress test--no problems since per pt--- followed by dr knapp    Chronic pain x 7 yrs    back    Constipation     with pain meds- takes MOM as needed    Degenerative disc disease     Hypercholesteremia     med    Hypertension     controlled with med    Kidney stone     Sleep apnea     per pt--- was told by nurse \"probably\" had it 2005 ago-- but was never tested    Spinal stenosis       Past Surgical History:   Procedure Laterality Date    COLONOSCOPY      LUMBAR LAMINECTOMY  12/2015    ORTHOPEDIC SURGERY Bilateral 04/2015    ctr    ORTHOPEDIC SURGERY      trigger finger release    OTHER SURGICAL HISTORY  3/2016, 4/2016    perianal abscess x 2     [place] [facility] [place] PROCEDURE UNLIST  1999    CABG x 5    TOTAL HIP ARTHROPLASTY Right 2005    [place] [facility] [place]     Medications: reviewed in chart

## 2024-06-19 ENCOUNTER — OFFICE VISIT (OUTPATIENT)
Dept: ORTHOPEDIC SURGERY | Age: 69
End: 2024-06-19
Payer: MEDICARE

## 2024-06-19 DIAGNOSIS — Z96.641 S/P TOTAL RIGHT HIP ARTHROPLASTY: Primary | ICD-10-CM

## 2024-06-19 DIAGNOSIS — M54.50 LOW BACK PAIN AT MULTIPLE SITES: ICD-10-CM

## 2024-06-19 DIAGNOSIS — M25.551 HIP PAIN, RIGHT: ICD-10-CM

## 2024-06-19 PROCEDURE — 99203 OFFICE O/P NEW LOW 30 MIN: CPT | Performed by: PHYSICIAN ASSISTANT

## 2024-06-19 PROCEDURE — 1123F ACP DISCUSS/DSCN MKR DOCD: CPT | Performed by: PHYSICIAN ASSISTANT

## 2024-06-19 NOTE — PROGRESS NOTES
Name: Parker Hernandez Jr.  YOB: 1955  Gender: male  MRN: [id number]      Chief complaint:  RIGHT HIP PAIN    History of Present Illness:     Parker Hernandez Jr. is a 68 y.o. male  Presents today with complaint of right hip tightness.  He does note that he has had chronic back issues before this has been doing outpatient physical therapy.  He states he has done 5 sessions so far and notes that his back pain has improved tremendously.  States his flexibility over the back is much better now.  He still has some tightness for [place] in the morning when he wakes up, but this is improved roughly 30 minutes after he gets going.  He does have a history of a right CARISSA performed about 19 years ago now.  His primary reason he presents today is because he had some squeaking over the right hip.  He is also wanting to ensure that he is not causing further damage by doing therapy exercises for his right hip and back.  He denies any pain in the groin or thigh today.  At this time is walking without assistive device.    Past Medical History:    Allergies:  Allergies   Allergen Reactions    Codeine Nausea And Vomiting     Other Reaction(s): Nausea and/or vomiting-Intolerance, Other (see comments), Unknown    Nausea       Medications:  Current Outpatient Medications   Medication Sig    albuterol sulfate HFA (PROVENTIL;VENTOLIN;PROAIR) 108 (90 Base) MCG/ACT inhaler Inhale 2 puffs into the lungs every 6 hours as needed    amoxicillin (AMOXIL) 500 MG tablet 1 tab(s) orally 3 times a day for 10 day(s)    azithromycin (ZITHROMAX) 250 MG tablet Take 2 tablets (500 mg) on day one followed by, 1 tablet (250 mg) once daily on days 2-5.    CELEBREX 200 MG capsule 1 cap(s) orally 2 times a day    diphenhydrAMINE-APAP, sleep, (TYLENOL PM EXTRA STRENGTH)  MG tablet Take 2 tablets by mouth    finasteride (PROSCAR) 5 MG tablet Take 1 tablet by mouth daily    MENATETRENONE PO Take by mouth daily    rivaroxaban (XARELTO) 20